# Patient Record
Sex: FEMALE | Race: WHITE | NOT HISPANIC OR LATINO | URBAN - METROPOLITAN AREA
[De-identification: names, ages, dates, MRNs, and addresses within clinical notes are randomized per-mention and may not be internally consistent; named-entity substitution may affect disease eponyms.]

---

## 2022-02-15 ENCOUNTER — OFFICE VISIT (OUTPATIENT)
Dept: URBAN - METROPOLITAN AREA CLINIC 70 | Facility: CLINIC | Age: 27
End: 2022-02-15
Payer: COMMERCIAL

## 2022-02-15 ENCOUNTER — WEB ENCOUNTER (OUTPATIENT)
Dept: URBAN - METROPOLITAN AREA CLINIC 70 | Facility: CLINIC | Age: 27
End: 2022-02-15

## 2022-02-15 DIAGNOSIS — Z3A.01 LESS THAN 8 WEEKS GESTATION OF PREGNANCY: ICD-10-CM

## 2022-02-15 DIAGNOSIS — F10.21 PERSONAL HISTORY OF ALCOHOLISM: ICD-10-CM

## 2022-02-15 DIAGNOSIS — R19.4 CHANGE IN BOWEL HABITS: ICD-10-CM

## 2022-02-15 PROCEDURE — 99203 OFFICE O/P NEW LOW 30 MIN: CPT | Performed by: NURSE PRACTITIONER

## 2022-02-15 NOTE — HPI-TODAY'S VISIT:
Patient presents today for evaluation of changes in bowel habits.  She is currently 7 weeks pregnant.  Over the last 2 months hsa noticed change in shape of bowel movements.  Stools have become more thin with "black specks".  Denies excessive use of NSAIDs or use of Pepto-Bismal prior to this.  Experiences a complete sensation of evacuation with defecation.    Has a hx of alcohol abuse but has ceased use since becoming pregnant 2 months ago.  Described alcohol consumption as being 1/2 pint to 1 pint a day for a year.  During this time, stools were more loose and occuring more than 4 times a day.  Stools were yellow/green in color.  Denies prior EGD or colonoscopy.

## 2022-03-17 ENCOUNTER — OFFICE VISIT (OUTPATIENT)
Dept: URBAN - METROPOLITAN AREA CLINIC 70 | Facility: CLINIC | Age: 27
End: 2022-03-17

## 2022-03-25 ENCOUNTER — OFFICE VISIT (OUTPATIENT)
Dept: URBAN - METROPOLITAN AREA CLINIC 70 | Facility: CLINIC | Age: 27
End: 2022-03-25
Payer: COMMERCIAL

## 2022-03-25 ENCOUNTER — LAB OUTSIDE AN ENCOUNTER (OUTPATIENT)
Dept: URBAN - METROPOLITAN AREA CLINIC 70 | Facility: CLINIC | Age: 27
End: 2022-03-25

## 2022-03-25 VITALS
WEIGHT: 165.4 LBS | HEART RATE: 86 BPM | SYSTOLIC BLOOD PRESSURE: 114 MMHG | DIASTOLIC BLOOD PRESSURE: 75 MMHG | BODY MASS INDEX: 28.24 KG/M2 | HEIGHT: 64 IN | TEMPERATURE: 98.8 F

## 2022-03-25 DIAGNOSIS — Z3A.12 12 WEEKS GESTATION OF PREGNANCY: ICD-10-CM

## 2022-03-25 DIAGNOSIS — R19.4 CHANGE IN BOWEL HABITS: ICD-10-CM

## 2022-03-25 DIAGNOSIS — F10.21 PERSONAL HISTORY OF ALCOHOLISM: ICD-10-CM

## 2022-03-25 DIAGNOSIS — R10.31 RIGHT LOWER QUADRANT PAIN: ICD-10-CM

## 2022-03-25 DIAGNOSIS — K59.09 CHRONIC CONSTIPATION: ICD-10-CM

## 2022-03-25 PROCEDURE — 99213 OFFICE O/P EST LOW 20 MIN: CPT | Performed by: NURSE PRACTITIONER

## 2022-03-25 RX ORDER — POLYETHYLENE GLYCOL 3350 17 G/17G
MIX ONE SCOOP IN AT LEAST 8 OZ OF WATER, TEA, COFFEE, JUICE POWDER, FOR SOLUTION ORAL
Qty: 510 GRAMS | Refills: 5 | OUTPATIENT
Start: 2022-03-25 | End: 2022-09-21

## 2022-03-25 NOTE — HPI-OTHER HISTORIES
-------------------------- Last office note 02/15/2022: Patient presents today for evaluation of changes in bowel habits.  She is currently 7 weeks pregnant.  Over the last 2 months hsa noticed change in shape of bowel movements.  Stools have become more thin with "black specks".  Denies excessive use of NSAIDs or use of Pepto-Bismal prior to this.  Experiences a complete sensation of evacuation with defecation.    Has a hx of alcohol abuse but has ceased use since becoming pregnant 2 months ago.  Described alcohol consumption as being 1/2 pint to 1 pint a day for a year.  During this time, stools were more loose and occuring more than 4 times a day.  Stools were yellow/green in color.  Denies prior EGD or colonoscopy.

## 2022-03-25 NOTE — HPI-TODAY'S VISIT:
Presents today for f/u in regards to change in bowel habits.  Continues to notice "specks" of black particles and narrow, flat shaped stools.  Defecation occurs every 2-3 days but fails to experience a complete sensation of evacuation.  Stools may also be "pasty" leading to constant need to wipe after defecation is complete.  Has a hx of constipation prior to pregnancy.  Currently voicing pain/discomfort RLQ that is intermittent.  Denies fever, chills, diarrhea or nausea/vomiting due to pain.  Denies recent imaging prior to pregnancy diagnosis.  Experiences occasional dyspesia that is described as bloating, indigestion and constant throat clearing. Currently 12 weeks gestation with PJ of October 6, 2022.

## 2022-04-21 ENCOUNTER — OFFICE VISIT (OUTPATIENT)
Dept: URBAN - METROPOLITAN AREA CLINIC 70 | Facility: CLINIC | Age: 27
End: 2022-04-21

## 2022-04-21 RX ORDER — POLYETHYLENE GLYCOL 3350 17 G/17G
MIX ONE SCOOP IN AT LEAST 8 OZ OF WATER, TEA, COFFEE, JUICE POWDER, FOR SOLUTION ORAL
Qty: 510 GRAMS | Refills: 5 | Status: ACTIVE | COMMUNITY
Start: 2022-03-25 | End: 2022-09-21

## 2022-05-05 ENCOUNTER — OFFICE VISIT (OUTPATIENT)
Dept: URBAN - METROPOLITAN AREA CLINIC 70 | Facility: CLINIC | Age: 27
End: 2022-05-05
Payer: COMMERCIAL

## 2022-05-05 VITALS
TEMPERATURE: 98.6 F | SYSTOLIC BLOOD PRESSURE: 100 MMHG | BODY MASS INDEX: 28.83 KG/M2 | HEART RATE: 80 BPM | WEIGHT: 168.9 LBS | DIASTOLIC BLOOD PRESSURE: 54 MMHG | HEIGHT: 64 IN

## 2022-05-05 DIAGNOSIS — K64.8 BLEEDING INTERNAL HEMORRHOIDS: ICD-10-CM

## 2022-05-05 DIAGNOSIS — Z3A.18 18 WEEKS GESTATION OF PREGNANCY: ICD-10-CM

## 2022-05-05 DIAGNOSIS — K59.09 CHRONIC CONSTIPATION: ICD-10-CM

## 2022-05-05 PROBLEM — 161466001: Status: ACTIVE | Noted: 2022-02-15

## 2022-05-05 PROCEDURE — 46600 DIAGNOSTIC ANOSCOPY SPX: CPT | Performed by: INTERNAL MEDICINE

## 2022-05-05 PROCEDURE — 99213 OFFICE O/P EST LOW 20 MIN: CPT | Performed by: INTERNAL MEDICINE

## 2022-05-05 RX ORDER — HYDROCORTISONE ACETATE 25 MG/1
INSERT 1 SUPPOSITORY SUPPOSITORY RECTAL
Qty: 14 | Refills: 1 | OUTPATIENT

## 2022-05-05 RX ORDER — POLYETHYLENE GLYCOL 3350 17 G/17G
MIX ONE SCOOP IN AT LEAST 8 OZ OF WATER, TEA, COFFEE, JUICE POWDER, FOR SOLUTION ORAL
Qty: 510 GRAMS | Refills: 5 | Status: DISCONTINUED | COMMUNITY
Start: 2022-03-25 | End: 2022-09-21

## 2022-05-05 NOTE — HPI-TODAY'S VISIT:
Patient presents today for evaluation of rectal bleeding that she noticed 4-5 days.  This occured with each BM about 1-2 times a day.  Denies rectal pressure, protrusion of tissue/hemorrhoids or constipation.  Feels constipation has improved over course of pregnancy with complete sense of evacuation voiced.  As result, has not been taking Miralax daily.  Denies prior hx of rectal bleeding.  Voices hx of hemorrhoids with last pregnancy.   Currently 18 weeks gestation with PJ of October 7, 2022.

## 2022-05-05 NOTE — PHYSICAL EXAM RECTAL:
No external hemorrhoids, normal tone, no masses palpable, no rectal bleeding, anoscope positive for internal hemorrhoids.

## 2022-05-05 NOTE — HPI-OTHER HISTORIES
---------------------------------------------------- Last office note 03/25/2022: Presents today for f/u in regards to change in bowel habits.  Continues to notice "specks" of black particles and narrow, flat shaped stools.  Defecation occurs every 2-3 days but fails to experience a complete sensation of evacuation.  Stools may also be "pasty" leading to constant need to wipe after defecation is complete.  Has a hx of constipation prior to pregnancy.  Currently voicing pain/discomfort RLQ that is intermittent.  Denies fever, chills, diarrhea or nausea/vomiting due to pain.  Denies recent imaging prior to pregnancy diagnosis.  Experiences occasional dyspesia that is described as bloating, indigestion and constant throat clearing. Currently 12 weeks gestation with PJ of October 6, 2022.

## 2022-05-13 ENCOUNTER — OFFICE VISIT (OUTPATIENT)
Dept: URBAN - METROPOLITAN AREA CLINIC 70 | Facility: CLINIC | Age: 27
End: 2022-05-13

## 2022-05-26 ENCOUNTER — DASHBOARD ENCOUNTERS (OUTPATIENT)
Age: 27
End: 2022-05-26

## 2022-05-31 ENCOUNTER — OFFICE VISIT (OUTPATIENT)
Dept: URBAN - METROPOLITAN AREA CLINIC 70 | Facility: CLINIC | Age: 27
End: 2022-05-31

## 2022-05-31 RX ORDER — HYDROCORTISONE ACETATE 25 MG/1
_INSERT 1 SUPPOSITORY SUPPOSITORY RECTAL
Qty: 14 | Refills: 1 | Status: ACTIVE | COMMUNITY

## 2022-05-31 NOTE — HPI-OTHER HISTORIES
--------------------------------------------- Last office note 05/05/2022: Patient presents today for evaluation of rectal bleeding that she noticed 4-5 days.  This occured with each BM about 1-2 times a day.  Denies rectal pressure, protrusion of tissue/hemorrhoids or constipation.  Feels constipation has improved over course of pregnancy with complete sense of evacuation voiced.  As result, has not been taking Miralax daily.  Denies prior hx of rectal bleeding.  Voices hx of hemorrhoids with last pregnancy.   Currently 18 weeks gestation with PJ of October 7, 2022.